# Patient Record
Sex: FEMALE | Race: BLACK OR AFRICAN AMERICAN | Employment: FULL TIME | URBAN - METROPOLITAN AREA
[De-identification: names, ages, dates, MRNs, and addresses within clinical notes are randomized per-mention and may not be internally consistent; named-entity substitution may affect disease eponyms.]

---

## 2019-04-07 PROBLEM — R55 SYNCOPE AND COLLAPSE: Status: ACTIVE | Noted: 2019-04-07

## 2019-04-07 PROBLEM — R55 SYNCOPE: Status: ACTIVE | Noted: 2019-04-07

## 2019-04-29 ENCOUNTER — OFFICE VISIT (OUTPATIENT)
Dept: FAMILY MEDICINE CLINIC | Age: 39
End: 2019-04-29

## 2019-04-29 VITALS
HEIGHT: 64 IN | BODY MASS INDEX: 28.54 KG/M2 | SYSTOLIC BLOOD PRESSURE: 130 MMHG | RESPIRATION RATE: 16 BRPM | TEMPERATURE: 97.4 F | WEIGHT: 167.2 LBS | DIASTOLIC BLOOD PRESSURE: 68 MMHG | OXYGEN SATURATION: 99 % | HEART RATE: 96 BPM

## 2019-04-29 DIAGNOSIS — K59.00 CONSTIPATION, UNSPECIFIED CONSTIPATION TYPE: ICD-10-CM

## 2019-04-29 DIAGNOSIS — I10 ESSENTIAL HYPERTENSION: ICD-10-CM

## 2019-04-29 DIAGNOSIS — E78.00 HYPERCHOLESTEREMIA: ICD-10-CM

## 2019-04-29 DIAGNOSIS — Z79.4 TYPE 2 DIABETES MELLITUS WITH COMPLICATION, WITH LONG-TERM CURRENT USE OF INSULIN (HCC): ICD-10-CM

## 2019-04-29 DIAGNOSIS — I65.22 STENOSIS OF CAVERNOUS PORTION OF LEFT INTERNAL CAROTID ARTERY: ICD-10-CM

## 2019-04-29 DIAGNOSIS — E87.6 HYPOKALEMIA: ICD-10-CM

## 2019-04-29 DIAGNOSIS — E87.6 HYPOKALEMIA: Primary | ICD-10-CM

## 2019-04-29 DIAGNOSIS — E11.8 TYPE 2 DIABETES MELLITUS WITH COMPLICATION, WITH LONG-TERM CURRENT USE OF INSULIN (HCC): ICD-10-CM

## 2019-04-29 DIAGNOSIS — I65.21 STENOSIS OF CAVERNOUS PORTION OF RIGHT INTERNAL CAROTID ARTERY: ICD-10-CM

## 2019-04-29 RX ORDER — ASPIRIN 81 MG/1
81 TABLET ORAL DAILY
COMMUNITY

## 2019-04-29 RX ORDER — INSULIN GLARGINE 100 [IU]/ML
20 INJECTION, SOLUTION SUBCUTANEOUS
COMMUNITY
End: 2021-04-30

## 2019-04-29 RX ORDER — CLOPIDOGREL BISULFATE 75 MG/1
TABLET ORAL
COMMUNITY
End: 2019-05-29 | Stop reason: SDUPTHER

## 2019-04-29 RX ORDER — LANCETS
EACH MISCELLANEOUS
Qty: 1 EACH | Refills: 11 | Status: SHIPPED | OUTPATIENT
Start: 2019-04-29

## 2019-04-29 RX ORDER — LOSARTAN POTASSIUM 100 MG/1
100 TABLET ORAL DAILY
COMMUNITY
End: 2019-05-29 | Stop reason: SDUPTHER

## 2019-04-29 RX ORDER — AMLODIPINE BESYLATE 10 MG/1
TABLET ORAL DAILY
COMMUNITY
End: 2019-05-29 | Stop reason: SDUPTHER

## 2019-04-29 RX ORDER — INSULIN PUMP SYRINGE, 3 ML
EACH MISCELLANEOUS
Qty: 1 KIT | Refills: 0 | Status: CANCELLED | OUTPATIENT
Start: 2019-04-29

## 2019-04-29 RX ORDER — DOCUSATE SODIUM 100 MG/1
100 CAPSULE, LIQUID FILLED ORAL 2 TIMES DAILY
Qty: 60 CAP | Refills: 2 | Status: SHIPPED | OUTPATIENT
Start: 2019-04-29 | End: 2019-07-28

## 2019-04-29 RX ORDER — ATORVASTATIN CALCIUM 80 MG/1
80 TABLET, FILM COATED ORAL DAILY
COMMUNITY
End: 2019-05-29 | Stop reason: SDUPTHER

## 2019-04-29 RX ORDER — GLIPIZIDE 10 MG/1
10 TABLET ORAL 2 TIMES DAILY
COMMUNITY
End: 2019-05-29 | Stop reason: SDUPTHER

## 2019-04-29 RX ORDER — LANCETS
EACH MISCELLANEOUS
Qty: 1 EACH | Refills: 11 | Status: CANCELLED | OUTPATIENT
Start: 2019-04-29

## 2019-04-29 NOTE — PATIENT INSTRUCTIONS
Learning About Diabetes Food Guidelines  Your Care Instructions    Meal planning is important to manage diabetes. It helps keep your blood sugar at a target level (which you set with your doctor). You don't have to eat special foods. You can eat what your family eats, including sweets once in a while. But you do have to pay attention to how often you eat and how much you eat of certain foods. You may want to work with a dietitian or a certified diabetes educator (CDE) to help you plan meals and snacks. A dietitian or CDE can also help you lose weight if that is one of your goals. What should you know about eating carbs? Managing the amount of carbohydrate (carbs) you eat is an important part of healthy meals when you have diabetes. Carbohydrate is found in many foods. · Learn which foods have carbs. And learn the amounts of carbs in different foods. ? Bread, cereal, pasta, and rice have about 15 grams of carbs in a serving. A serving is 1 slice of bread (1 ounce), ½ cup of cooked cereal, or 1/3 cup of cooked pasta or rice. ? Fruits have 15 grams of carbs in a serving. A serving is 1 small fresh fruit, such as an apple or orange; ½ of a banana; ½ cup of cooked or canned fruit; ½ cup of fruit juice; 1 cup of melon or raspberries; or 2 tablespoons of dried fruit. ? Milk and no-sugar-added yogurt have 15 grams of carbs in a serving. A serving is 1 cup of milk or 2/3 cup of no-sugar-added yogurt. ? Starchy vegetables have 15 grams of carbs in a serving. A serving is ½ cup of mashed potatoes or sweet potato; 1 cup winter squash; ½ of a small baked potato; ½ cup of cooked beans; or ½ cup cooked corn or green peas. · Learn how much carbs to eat each day and at each meal. A dietitian or CDE can teach you how to keep track of the amount of carbs you eat. This is called carbohydrate counting. · If you are not sure how to count carbohydrate grams, use the Plate Method to plan meals.  It is a good, quick way to make sure that you have a balanced meal. It also helps you spread carbs throughout the day. ? Divide your plate by types of foods. Put non-starchy vegetables on half the plate, meat or other protein food on one-quarter of the plate, and a grain or starchy vegetable in the final quarter of the plate. To this you can add a small piece of fruit and 1 cup of milk or yogurt, depending on how many carbs you are supposed to eat at a meal.  · Try to eat about the same amount of carbs at each meal. Do not \"save up\" your daily allowance of carbs to eat at one meal.  · Proteins have very little or no carbs per serving. Examples of proteins are beef, chicken, turkey, fish, eggs, tofu, cheese, cottage cheese, and peanut butter. A serving size of meat is 3 ounces, which is about the size of a deck of cards. Examples of meat substitute serving sizes (equal to 1 ounce of meat) are 1/4 cup of cottage cheese, 1 egg, 1 tablespoon of peanut butter, and ½ cup of tofu. How can you eat out and still eat healthy? · Learn to estimate the serving sizes of foods that have carbohydrate. If you measure food at home, it will be easier to estimate the amount in a serving of restaurant food. · If the meal you order has too much carbohydrate (such as potatoes, corn, or baked beans), ask to have a low-carbohydrate food instead. Ask for a salad or green vegetables. · If you use insulin, check your blood sugar before and after eating out to help you plan how much to eat in the future. · If you eat more carbohydrate at a meal than you had planned, take a walk or do other exercise. This will help lower your blood sugar. What else should you know? · Limit saturated fat, such as the fat from meat and dairy products. This is a healthy choice because people who have diabetes are at higher risk of heart disease. So choose lean cuts of meat and nonfat or low-fat dairy products.  Use olive or canola oil instead of butter or shortening when cooking. · Don't skip meals. Your blood sugar may drop too low if you skip meals and take insulin or certain medicines for diabetes. · Check with your doctor before you drink alcohol. Alcohol can cause your blood sugar to drop too low. Alcohol can also cause a bad reaction if you take certain diabetes medicines. Follow-up care is a key part of your treatment and safety. Be sure to make and go to all appointments, and call your doctor if you are having problems. It's also a good idea to know your test results and keep a list of the medicines you take. Where can you learn more? Go to http://zina-roberta.info/. Enter K708 in the search box to learn more about \"Learning About Diabetes Food Guidelines. \"  Current as of: July 25, 2018  Content Version: 11.9  © 5691-2377 [employer]. Care instructions adapted under license by [employer] (which disclaims liability or warranty for this information). If you have questions about a medical condition or this instruction, always ask your healthcare professional. [place] [address] any warranty or liability for your use of this information. Counting Carbohydrates: Care Instructions  Your Care Instructions    You don't have to eat special foods when you have diabetes. You just have to be careful to eat healthy foods. Carbohydrates (carbs) raise blood sugar higher and quicker than any other nutrient. Carbs are found in desserts, breads and cereals, and fruit. They're also in starchy vegetables. These include potatoes, corn, and grains such as rice and pasta. Carbs are also in milk and yogurt. The more carbs you eat at one time, the higher your blood sugar will rise. Spreading carbs all through the day helps keep your blood sugar levels within your target range. Counting carbs is one of the best ways to keep your blood sugar under control.   If you use insulin, counting carbs helps you match the right amount of insulin to the number of grams of carbs in a meal. Then you can change your diet and insulin dose as needed. Testing your blood sugar several times a day can help you learn how carbs affect your blood sugar. A registered dietitian or certified diabetes educator can help you plan meals and snacks. Follow-up care is a key part of your treatment and safety. Be sure to make and go to all appointments, and call your doctor if you are having problems. It's also a good idea to know your test results and keep a list of the medicines you take. How can you care for yourself at home? Know your daily amount of carbohydrates  Your daily amount depends on several things, such as your weight, how active you are, which diabetes medicines you take, and what your goals are for your blood sugar levels. A registered dietitian or certified diabetes educator can help you plan how many carbs to include in each meal and snack. For most adults, a guideline for the daily amount of carbs is:  · 45 to 60 grams at each meal. That's about the same as 3 to 4 carbohydrate servings. · 15 to 20 grams at each snack. That's about the same as 1 carbohydrate serving. Count carbs  Counting carbs lets you know how much rapid-acting insulin to take before you eat. If you use an insulin pump, you get a constant rate of insulin during the day. So the pump must be programmed at meals. This gives you extra insulin to cover the rise in blood sugar after meals. If you take insulin:  · Learn your own insulin-to-carb ratio. You and your diabetes health professional will figure out the ratio. You can do this by testing your blood sugar after meals. For example, you may need a certain amount of insulin for every 15 grams of carbs. · Add up the carb grams in a meal. Then you can figure out how many units of insulin to take based on your insulin-to-carb ratio. · Exercise lowers blood sugar.  You can use less insulin than you would if you were not doing exercise. Keep in mind that timing matters. If you exercise within 1 hour after a meal, your body may need less insulin for that meal than it would if you exercised 3 hours after the meal. Test your blood sugar to find out how exercise affects your need for insulin. If you do or don't take insulin:  · Look at labels on packaged foods. This can tell you how many carbs are in a serving. You can also use guides from the American Diabetes Association. · Be aware of portions, or serving sizes. If a package has two servings and you eat the whole package, you need to double the number of grams of carbohydrate listed for one serving. · Protein, fat, and fiber do not raise blood sugar as much as carbs do. If you eat a lot of these nutrients in a meal, your blood sugar will rise more slowly than it would otherwise. Eat from all food groups  · Eat at least three meals a day. · Plan meals to include food from all the food groups. The food groups include grains, fruits, dairy, proteins, and vegetables. · Talk to your dietitian or diabetes educator about ways to add limited amounts of sweets into your meal plan. · If you drink alcohol, talk to your doctor. It may not be recommended when you are taking certain diabetes medicines. Where can you learn more? Go to http://zina-roberta.info/. Enter J111 in the search box to learn more about \"Counting Carbohydrates: Care Instructions. \"  Current as of: July 25, 2018  Content Version: 11.9  © 2257-8913 [email or web address]. Care instructions adapted under license by [employer] (which disclaims liability or warranty for this information). If you have questions about a medical condition or this instruction, always ask your healthcare professional. [place] [address] any warranty or liability for your use of this information.

## 2019-04-29 NOTE — PROGRESS NOTES
ESTABLISH CARE VISIT    SUBJECTIVE:     HPI: 42 y.o.  female  has a past medical history of Diabetes ([address] Hypercholesteremia, and Hypertension. is here for the above chief complaint(s). Patient states that she was first diagnosed with diabetes in 2002. Patient states that she was diagnosed with DM after her first child. She reports that she was trying to manage diabetes but then lost insurance. She also states that she wasn't feeling poorly so she didn't feel she needed to go to the doctor. Patient states that a few weeks ago she had a syncopal episode and went to the ED. She states tests were done and she was noted to have diabetes and was discharged with metformin. About a week later, she woke up in the middle of the night with chest pain. 911 was called and she went to the [facility]. Notes per [facility]:    ROSARIO GIFFORD Course   Chief Complaint/History of Present Illness:   HPI  Patient with h/o DM, supposed to be on metformin but does not take it due to GI side effects, presents with chest pain, associated with shortness of breath that woke her up from sleep today. 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 She also had a syncopal episode last sunday    Problems Managed During Hospitalization:  Chest pain, r/o acs  -serial CE noted  -tele  -nst wnl     Headache  -resolved     DM II  -does not want to take metformin due to side effects  -added glipizide and increased januvia  -lantus  -optimize as outpatient  -HbA1c 11.7     HTN, uncontrolled  -norvasc  -cozaar  -improved now  -optimize as outpatient     Recent syncope  -none recurrent  -echo  -MRI/MRA noted     Cavernous ica severe stenosis  -DAPT per d/w   -lipitor  -repeat cta in 1 year  -BP, DM control and smoking cessation     HLD  -lipitor  -ldl >300     Hypokalemia  -replaced     Elevated d-dimer  -pvl no vte  -cta no VTE     Tobacco abuse  -counseled.      Hospital Course:  Patient admitted with above, remained stable in house, BP and BG now improved, overall prognosis is guarded. Patient is felt to have achieved maximum benefit from inpatient stay and is in stable health to be discharged. DM-2  Patient has been taking her medications as prescribed. Key Antihyperglycemic Medications             insulin glargine (LANTUS U-100 INSULIN) 100 unit/mL injection (Taking) 15 Units by SubCUTAneous route nightly. glipiZIDE (GLUCOTROL) 10 mg tablet (Taking) Take 10 mg by mouth two (2) times a day. SITagliptin (JANUVIA) 100 mg tablet (Taking) Take 100 mg by mouth daily. Patient is tolerating medications well, but she does endorse constipation. Patient endorses polyuria, polydipsia. Patient has bilateral numbness and pain in her feet. She also has some blurred vision. She went to see an eye doctor (optometrist), but he explained to her that her vision will improve as her diabetes improves and to return for another visit in a few months. Lab Results   Component Value Date/Time    Hemoglobin A1c 12.5 (H) 04/07/2019 04:37 AM       medication compliance: compliant all of the time, diabetic diet compliance: compliant most of the time, home glucose monitoring: fasting values range 180's, nonfasting values range 180's (one time is 202)  Patient has discontinued soda and is trying to improve her eating habits. Hypertension  Patient is currently taking   Key CAD CHF Meds             amLODIPine (NORVASC) 10 mg tablet (Taking) Take  by mouth daily. aspirin delayed-release 81 mg tablet (Taking) Take  by mouth daily. losartan (COZAAR) 100 mg tablet (Taking) Take 100 mg by mouth daily. . BP today is   BP Readings from Last 1 Encounters:   04/07/19 140/90   Repeat /68    Patient has been taking medications as prescribed. Patient is checking BP at home. Patient's BP averages 120/70's. 2 times she had 150's/90's. Patient is not follow low salt diet.  Patient is not currently exercising. Patient denies chest pain, shortness of breath, palpitations, lower extremity edema, dizziness/lightheadedness or syncopal episodes. Elevated Lipids/Cavernous ica severe stenosis    atorvastatin (LIPITOR) 80 mg tablet (Taking) Take 80 mg by mouth daily. clopidogrel (PLAVIX) 75 mg tab (Taking) Take  by mouth. Lab Results   Component Value Date/Time    Cholesterol, total 326 (H) 04/07/2019 04:37 AM    HDL Cholesterol 29 (L) 04/07/2019 04:37 AM    LDL, calculated Not Performed, Trig >400 04/07/2019 04:37 AM    Triglyceride 497 (H) 04/07/2019 04:37 AM    CHOL/HDL Ratio 11.2 (H) 04/07/2019 04:37 AM       Obesity    Wt Readings from Last 5 Encounters:   04/07/19 175 lb (79.4 kg)   01/25/19 170 lb (77.1 kg)   11/01/18 160 lb (72.6 kg)   12/09/17 170 lb (77.1 kg)   09/28/17 183 lb (83 kg)       Diet: advised diabetic diet. Advised diabetic counseling, patient declined at this time as she states she has done this before and doesn't feel she needs it. Will continue conversation. Exercise: Patient has not started exercising yet. Her mother is currently in the hospital with a leg amputation so she has been unable to begin exercising. She does state that she has been walking the halls at the hospital.    Stress management/Tobacco abuse- stress not managed well. She has a lot of stress right now with her mother's illness. Tobacco abuse  Patient has been smoking    Health Maintenance:    Eye -  no complaints, last eye exam: 1 month ago, needs to follow-up in 1-2 months  Oral Health -  no complaints, last exam:   Hearing -  no complaints. U/A -  no UTI sxs  Cardiac- denies history, denies chest pain.  ACS work-up negative in the ED   Pulmonary health/Smoking history- , denies cough, SOB.- Smoking - Current smoker-   GYN-  Due, patient will make apt with me for well woman exam  DEXA - screen at 50  Colonoscopy - no colon cancer in the family, screen at 48       Review of Systems   Constitutional: Negative for chills, fever, malaise/fatigue and weight loss. Eyes: Negative for blurred vision, double vision and pain. Respiratory: Negative for cough, sputum production, shortness of breath and wheezing. Cardiovascular: Negative for chest pain, palpitations, orthopnea, claudication and leg swelling. Gastrointestinal: Positive for constipation. Negative for abdominal pain, diarrhea, nausea and vomiting. Genitourinary: Negative for dysuria, frequency and urgency. Neurological: Positive for tingling. Negative for dizziness, sensory change, speech change, focal weakness, weakness and headaches. Current Outpatient Medications:     glucose blood VI test strips (ASCENSIA AUTODISC VI, ONE TOUCH ULTRA TEST VI) strip, To be used to check glucose levels twice a day., Disp: 100 Strip, Rfl: 3    insulin glargine (LANTUS U-100 INSULIN) 100 unit/mL injection, 15 Units by SubCUTAneous route nightly., Disp: , Rfl:     amLODIPine (NORVASC) 10 mg tablet, Take  by mouth daily. , Disp: , Rfl:     aspirin delayed-release 81 mg tablet, Take  by mouth daily. , Disp: , Rfl:     atorvastatin (LIPITOR) 80 mg tablet, Take 80 mg by mouth daily. , Disp: , Rfl:     clopidogrel (PLAVIX) 75 mg tab, Take  by mouth., Disp: , Rfl:     glipiZIDE (GLUCOTROL) 10 mg tablet, Take 10 mg by mouth two (2) times a day., Disp: , Rfl:     losartan (COZAAR) 100 mg tablet, Take 100 mg by mouth daily. , Disp: , Rfl:     SITagliptin (JANUVIA) 100 mg tablet, Take 100 mg by mouth daily. , Disp: , Rfl:     lancets misc, To be used twice daily for diabetic testing., Disp: 1 Each, Rfl: 11    docusate sodium (DULCOLAX STOOL SOFTENER, DSS,) 100 mg capsule, Take 1 Cap by mouth two (2) times a day for 90 days. , Disp: 60 Cap, Rfl: 2    Health Maintenance   Topic Date Due    Pneumococcal 0-64 years (1 of 1 - PPSV23) 01/15/1988    FOOT EXAM Q1  01/15/1992    MICROALBUMIN Q1  01/15/1992    EYE EXAM RETINAL OR DILATED  01/15/1992    DTaP/Tdap/Td series (1 - Tdap) 01/15/2003    PAP AKA CERVICAL CYTOLOGY  01/15/2003    Influenza Age 9 to Adult  08/01/2019    HEMOGLOBIN A1C Q6M  10/07/2019    LIPID PANEL Q1  04/07/2020       Medications and Allergies: Reviewed and confirmed in the chart    Past Medical Hx: Reviewed and confirmed in the chart  Past Medical History:   Diagnosis Date    Diabetes ([facility] [address]     Hypercholesteremia     Hypertension        Patient Active Problem List   Diagnosis Code    Syncope and collapse R55    Syncope R55       Family Hx, Surgical Hx, Social Hx: Reviewed and updated in EMR    OBJECTIVE:  There were no vitals filed for this visit. BP Readings from Last 3 Encounters:   04/07/19 140/90   01/25/19 (!) 157/91   11/01/18 (!) 146/97     Wt Readings from Last 3 Encounters:   04/07/19 175 lb (79.4 kg)   01/25/19 170 lb (77.1 kg)   11/01/18 160 lb (72.6 kg)        Physical Exam   Constitutional: She is oriented to person, place, and time and well-developed, well-nourished, and in no distress. No distress. Neck: Normal range of motion. Neck supple. No thyromegaly present. Cardiovascular: Normal rate, regular rhythm, normal heart sounds and intact distal pulses. Exam reveals no gallop and no friction rub. No murmur heard. Pulses:       Carotid pulses are 2+ on the right side, and 2+ on the left side. BILATERAL 2+ LOWER EXTREMITY EDEMA   Pulmonary/Chest: Effort normal and breath sounds normal. No respiratory distress. She has no wheezes. She has no rales. She exhibits no tenderness. Lymphadenopathy:     She has no cervical adenopathy. Neurological: She is alert and oriented to person, place, and time. Skin: Skin is warm and dry. She is not diaphoretic. Psychiatric: Mood, memory, affect and judgment normal.   Vitals reviewed.         Lab Results   Component Value Date/Time    WBC 9.8 04/07/2019 04:37 AM    HGB 11.5 (L) 04/07/2019 04:37 AM    HCT 33.8 (L) 04/07/2019 04:37 AM    PLATELET 787 43/50/9168 04:37 AM     Lab Results   Component Value Date/Time    Sodium 135 (L) 04/07/2019 04:37 AM    Potassium 3.1 (L) 04/07/2019 04:37 AM    Chloride 100 04/07/2019 04:37 AM    CO2 30 04/07/2019 04:37 AM    Glucose 300 (H) 04/07/2019 04:37 AM    BUN 9 04/07/2019 04:37 AM    Creatinine 0.8 04/07/2019 04:37 AM     Lab Results   Component Value Date/Time    Cholesterol, total 326 (H) 04/07/2019 04:37 AM    HDL Cholesterol 29 (L) 04/07/2019 04:37 AM    LDL, calculated Not Performed, Trig >400 04/07/2019 04:37 AM    Triglyceride 497 (H) 04/07/2019 04:37 AM     No results found for: GPT    CTA BRAIN AND NECK  IMPRESSION  1. SARAH cavernous segment 60% diameter stenosis  -LICA cavernous segment 40% diameter stenosis  -Recommend at least one year CTA or MRA head  2. Other intracranial arteries  -Mild LMCA stenosis (only)  3. Neck arteries  -Carotids nonstenotic  -RVA suggested mild origin stenosis  -LVA V1 segment poorly evaluated. Nonstenotic more distally  Nursing Notes Reviewed    ASSESSMENT AND PLAN    ICD-10-CM ICD-9-CM    1. Hypokalemia E87.6 276.8 POTASSIUM      MAGNESIUM   2. Type 2 diabetes mellitus with complication, with long-term current use of insulin (HCC) E11.8 250.90 glucose blood VI test strips (ASCENSIA AUTODISC VI, ONE TOUCH ULTRA TEST VI) strip    Z79.4 V58.67 lancets misc   3. Essential hypertension I10 401.9 Continue current management. Key CAD CHF Meds             amLODIPine (NORVASC) 10 mg tablet (Taking) Take  by mouth daily. aspirin delayed-release 81 mg tablet (Taking) Take  by mouth daily. atorvastatin (LIPITOR) 80 mg tablet (Taking) Take 80 mg by mouth daily. clopidogrel (PLAVIX) 75 mg tab (Taking) Take  by mouth.    losartan (COZAAR) 100 mg tablet (Taking) Take 100 mg by mouth daily. 4. Hypercholesteremia E78.00 272.0 Continue current management as above. 5. Constipation, unspecified constipation type K59.00 564.00 docusate sodium (DULCOLAX STOOL SOFTENER, DSS,) 100 mg capsule   6.  Stenosis of cavernous portion of left internal carotid artery I65.22 433.10 Repeat CTA in 1 year. Discuss vascular referral at next visit. 7. Stenosis of cavernous portion of right internal carotid artery I65.21 433.10 As above       I have discussed the diagnosis with the patient and the intended plan as seen in the above orders. The patient has received an after-visit summary and questions were answered concerning future plans. I have discussed medication side effects and warnings with the patient as well. I have reviewed the plan of care with the patient, accepted their input and they are in agreement with the treatment goals. A total of 45 minutes were spent face-to-face with the patient during this encounter and over half of that time was spent on counseling and coordination of care. We discussed in depth the importance of managing diabetes, hypertension, hyperlipidemia. I also educated the patient about stenosis of SARAH and LICA. Renetta DENT  [address] [place] [address] [phone] [place] [address] [phone]  [phone]

## 2019-04-29 NOTE — PROGRESS NOTES
Chief Complaint   Patient presents with   [address] Follow Up     [facility] and [facility]-passed out, hypertension, chest pain, SOB, DM     1. Have you been to the ER, urgent care clinic since your last visit? Hospitalized since your last visit? [facility] and [facility]-April 2019    2. Have you seen or consulted any other health care providers outside of the [address] [place] [address] since your last visit? Include any pap smears or colon screening.  No

## 2019-04-30 LAB
MAGNESIUM SERPL-MCNC: 2.1 MG/DL (ref 1.6–2.3)
POTASSIUM SERPL-SCNC: 3.6 MMOL/L (ref 3.5–5.2)

## 2019-05-03 ENCOUNTER — TELEPHONE (OUTPATIENT)
Dept: FAMILY MEDICINE CLINIC | Age: 39
End: 2019-05-03

## 2019-05-03 NOTE — TELEPHONE ENCOUNTER
Called patient and LVM to call back. Need to explain that her potassium levels are normal, but borderline. I would like to start her on a low dose potassium supplement. Will call in to her pharmacy if she is amenable. Renetta Durant PA-C  [facility]  [phone]  [address]

## 2019-05-29 ENCOUNTER — OFFICE VISIT (OUTPATIENT)
Dept: FAMILY MEDICINE CLINIC | Age: 39
End: 2019-05-29

## 2019-05-29 VITALS
BODY MASS INDEX: 25.78 KG/M2 | SYSTOLIC BLOOD PRESSURE: 130 MMHG | DIASTOLIC BLOOD PRESSURE: 84 MMHG | HEART RATE: 96 BPM | WEIGHT: 151 LBS | OXYGEN SATURATION: 98 % | HEIGHT: 64 IN | TEMPERATURE: 95.7 F | RESPIRATION RATE: 16 BRPM

## 2019-05-29 DIAGNOSIS — E87.6 HYPOKALEMIA: ICD-10-CM

## 2019-05-29 DIAGNOSIS — R11.0 NAUSEA: ICD-10-CM

## 2019-05-29 DIAGNOSIS — I10 ESSENTIAL HYPERTENSION: ICD-10-CM

## 2019-05-29 DIAGNOSIS — A04.72 C. DIFFICILE COLITIS: ICD-10-CM

## 2019-05-29 DIAGNOSIS — E87.6 HYPOKALEMIA: Primary | ICD-10-CM

## 2019-05-29 DIAGNOSIS — E78.00 HYPERCHOLESTEREMIA: ICD-10-CM

## 2019-05-29 DIAGNOSIS — Z79.4 TYPE 2 DIABETES MELLITUS WITH COMPLICATION, WITH LONG-TERM CURRENT USE OF INSULIN (HCC): ICD-10-CM

## 2019-05-29 DIAGNOSIS — I65.22 STENOSIS OF CAVERNOUS PORTION OF LEFT INTERNAL CAROTID ARTERY: ICD-10-CM

## 2019-05-29 DIAGNOSIS — E11.8 TYPE 2 DIABETES MELLITUS WITH COMPLICATION, WITH LONG-TERM CURRENT USE OF INSULIN (HCC): ICD-10-CM

## 2019-05-29 RX ORDER — METOPROLOL TARTRATE 25 MG/1
TABLET, FILM COATED ORAL 2 TIMES DAILY
COMMUNITY
End: 2020-02-01

## 2019-05-29 RX ORDER — CLOPIDOGREL BISULFATE 75 MG/1
75 TABLET ORAL DAILY
Qty: 90 TAB | Refills: 1 | Status: SHIPPED | OUTPATIENT
Start: 2019-05-29

## 2019-05-29 RX ORDER — ATORVASTATIN CALCIUM 80 MG/1
80 TABLET, FILM COATED ORAL DAILY
Qty: 90 TAB | Refills: 1 | Status: SHIPPED | OUTPATIENT
Start: 2019-05-29

## 2019-05-29 RX ORDER — GLIPIZIDE 10 MG/1
10 TABLET ORAL 2 TIMES DAILY
Qty: 90 TAB | Refills: 1 | Status: SHIPPED | OUTPATIENT
Start: 2019-05-29 | End: 2019-09-27

## 2019-05-29 RX ORDER — PROMETHAZINE HYDROCHLORIDE 12.5 MG/1
12.5 TABLET ORAL
Qty: 30 TAB | Refills: 0 | Status: SHIPPED | OUTPATIENT
Start: 2019-05-29 | End: 2020-02-01

## 2019-05-29 RX ORDER — AMLODIPINE BESYLATE 10 MG/1
10 TABLET ORAL DAILY
Qty: 90 TAB | Refills: 1 | Status: SHIPPED | OUTPATIENT
Start: 2019-05-29 | End: 2020-02-01

## 2019-05-29 RX ORDER — LOSARTAN POTASSIUM 100 MG/1
100 TABLET ORAL DAILY
Qty: 90 TAB | Refills: 1 | Status: SHIPPED | OUTPATIENT
Start: 2019-05-29 | End: 2020-02-01

## 2019-05-29 NOTE — LETTER
NOTIFICATION RETURN TO WORK / SCHOOL 
 
5/29/2019 10:32 AM 
 
Ms. Carrie [place] [address] [place] [address] To Whom It May Concern: 
 
Britni Tao is currently under the care of [facility]. She will return to work/school on: 5/30/2019 If there are questions or concerns please have the patient contact our office.  
 
 
 
Sincerely, 
 
 
Tonia Valenzuela PA-C

## 2019-05-29 NOTE — PROGRESS NOTES
Patient: Santos Razo  Date of Service: 5/29/2019   Provider: Sheryle Slot, PA-C     REASON FOR VISIT:   Chief Complaint   Patient presents with   [facility] Follow Up     Larisa Lyons-C-diff- 5/11/19. Last episode of diarrhea was 5/11/19        HISTORY OF PRESENT ILLNESS:   Santos Razo is a 42 y.o. female who presents to the office for acute care. C. Diff Infection  Patient went to the ED on 5/09/2019 with nausea,vomting, diarrhea. She was given IV hydration and antiemetic and discharged. She became more ill, with increased vomiting and diarrhea, so she returned ot the ED on 5/11/2019. C. Diff came testing came back positive. She was admitted and given IV antibiotics, ultimately discharged after 1 day and given outpatient vancomycin for c.diff infection. Last episode of diarrhea was 5/11/2019. Patient has had normal, formed BM nearly every day since 5/11. She continues to have some nausea and has been treating this with dramamine. She denies any further vomiting episodes. She denies any abdominal pain or diarrhea, no bloody stools or darks stools at this time. She is ready to go back to work and would like [employer] to return to work. Hypertension  Patient's BP was elevated while in the hospital, she was given metoprolol 25mg BID on discharge. Her BP has been controlled at home, someimtes elevated first thing in the morning. Patient is currently taking   Key CAD CHF Meds             metoprolol tartrate (LOPRESSOR) 25 mg tablet (Taking) Take  by mouth two (2) times a day. amLODIPine (NORVASC) 10 mg tablet (Taking) Take  by mouth daily. aspirin delayed-release 81 mg tablet (Taking) Take  by mouth daily. atorvastatin (LIPITOR) 80 mg tablet (Taking) Take 80 mg by mouth daily. clopidogrel (PLAVIX) 75 mg tab (Taking) Take  by mouth.    losartan (COZAAR) 100 mg tablet (Taking) Take 100 mg by mouth daily.       . BP today is   BP Readings from Last 1 Encounters:   05/29/19 (!) 153/102     Patient has been taking medications as prescribed. Patient is/is not checking BP at home. Patient's BP averages 120's/90's Patient does follow low salt diet. Patient is not currently exercising. Patient denies chest pain, shortness of breath, palpitations, lower extremity edema, dizziness/lightheadedness or syncopal episodes. Review of Systems   Constitutional: Negative for chills, fever, malaise/fatigue and weight loss. Eyes: Negative for blurred vision, double vision and pain. Respiratory: Negative for cough, sputum production, shortness of breath and wheezing. Cardiovascular: Negative for chest pain, palpitations, orthopnea, claudication and leg swelling. Gastrointestinal: Positive for nausea. Negative for abdominal pain, blood in stool, constipation, diarrhea, melena and vomiting. Genitourinary: Negative for dysuria, frequency and urgency. Neurological: Negative for dizziness, tingling and headaches. Physical Exam   Constitutional: She is oriented to person, place, and time and well-developed, well-nourished, and in no distress. No distress. Neck: Normal range of motion. Neck supple. No thyromegaly present. Cardiovascular: Normal rate, regular rhythm, normal heart sounds and intact distal pulses. Exam reveals no gallop and no friction rub. No murmur heard. Pulmonary/Chest: Effort normal and breath sounds normal. No respiratory distress. She has no wheezes. She has no rales. She exhibits no tenderness. Abdominal: Soft. Bowel sounds are normal. She exhibits no distension and no mass. There is no tenderness. There is no rebound and no guarding. Lymphadenopathy:     She has no cervical adenopathy. Neurological: She is alert and oriented to person, place, and time. Skin: Skin is warm and dry. She is not diaphoretic. Psychiatric: Mood, memory, affect and judgment normal.   Vitals reviewed.           Visit Vitals  BP (!) 153/102   Pulse 96   Temp 95.7 °F (35.4 °C) (Oral)   Resp 16   Ht 5' 4\" (1.626 m)   Wt 151 lb (68.5 kg)   LMP 05/17/2019 (Approximate)   SpO2 98%   BMI 25.92 kg/m²         ALLERGIES:   No Known Allergies     ACTIVE MEDICAL PROBLEMS:  Patient Active Problem List   Diagnosis Code    Syncope and collapse R55    Syncope R55    Hypokalemia E87.6    Hypertension I10    Hypercholesteremia E78.00    Diabetes ([facility] [address] E11.9        MEDICATIONS:   Current Outpatient Medications   Medication Sig Dispense Refill    metoprolol tartrate (LOPRESSOR) 25 mg tablet Take  by mouth two (2) times a day.  glucose blood VI test strips (ASCENSIA AUTODISC VI, ONE TOUCH ULTRA TEST VI) strip To be used to check glucose levels twice a day. 100 Strip 3    insulin glargine (LANTUS U-100 INSULIN) 100 unit/mL injection 15 Units by SubCUTAneous route nightly.  amLODIPine (NORVASC) 10 mg tablet Take  by mouth daily.  aspirin delayed-release 81 mg tablet Take  by mouth daily.  atorvastatin (LIPITOR) 80 mg tablet Take 80 mg by mouth daily.  clopidogrel (PLAVIX) 75 mg tab Take  by mouth.  glipiZIDE (GLUCOTROL) 10 mg tablet Take 10 mg by mouth two (2) times a day.  losartan (COZAAR) 100 mg tablet Take 100 mg by mouth daily.  SITagliptin (JANUVIA) 100 mg tablet Take 100 mg by mouth daily.  lancets misc To be used twice daily for diabetic testing. 1 Each 11    docusate sodium (DULCOLAX STOOL SOFTENER, DSS,) 100 mg capsule Take 1 Cap by mouth two (2) times a day for 90 days. 60 Cap 2    metFORMIN (GLUCOPHAGE) 500 mg tablet Take 1 Tab by mouth two (2) times daily (with meals). 30 Tab 0        ROS:   Pertinent positive as above in HPI. All others negative. PHYSICAL EXAMINATION:  Visit Vitals  BP (!) 153/102   Pulse 96   Temp 95.7 °F (35.4 °C) (Oral)   Resp 16   Ht 5' 4\" (1.626 m)   Wt 151 lb (68.5 kg)   LMP 05/17/2019 (Approximate)   SpO2 98%   BMI 25.92 kg/m²      Body mass index is 25.92 kg/m².       ASSESSMENT/PLAN:  Diagnoses and all orders for this visit:    1. Hypokalemia  -     POTASSIUM; Future  -     MAGNESIUM; Future    - will call with results. 2. Nausea  -     promethazine (PHENERGAN) 12.5 mg tablet; Take 1 Tab by mouth every six (6) hours as needed for Nausea. 3. C. difficile colitis  -     lactobacillus rhamnosus gg 10 billion cell (CULTURELLE) 10 billion cell capsule; Take 1 Cap by mouth daily. 4. Type 2 diabetes mellitus with complication, with long-term current use of insulin (HCC)  -     glipiZIDE (GLUCOTROL) 10 mg tablet; Take 1 Tab by mouth two (2) times a day. -     SITagliptin (JANUVIA) 100 mg tablet; Take 1 Tab by mouth daily. 5. Essential hypertension  -     amLODIPine (NORVASC) 10 mg tablet; Take 1 Tab by mouth daily. -     losartan (COZAAR) 100 mg tablet; Take 1 Tab by mouth daily. 6. Stenosis of cavernous portion of left internal carotid artery  -     clopidogrel (PLAVIX) 75 mg tab; Take 1 Tab by mouth daily. 7. Hypercholesteremia  -     atorvastatin (LIPITOR) 80 mg tablet; Take 1 Tab by mouth daily. Patient advised to return to clinic if symptoms persist or to ED if they become worse  Patient verbalized understanding to above instructions. A total of 25 minutes were spent face-to-face with the patient during this encounter and over half of that time was spent on counseling and coordination of care. We discussed in depth the importance of TAKING PROBIOTIC I also educated the patient about c. Diff.            Ginger Durant PA-C   5/29/2019   10:14 AM

## 2019-05-29 NOTE — PROGRESS NOTES
Chief Complaint   Patient presents with   [address] [id number]- 5/11/19. Last episode of diarrhea was 5/11/19     1. Have you been to the ER, urgent care clinic since your last visit? Hospitalized since your last visit? Larisa Hernandez Diff    2. Have you seen or consulted any other health care providers outside of the [address] [place] [address] since your last visit? Include any pap smears or colon screening.  No

## 2019-05-30 LAB
MAGNESIUM SERPL-MCNC: 2 MG/DL (ref 1.6–2.3)
POTASSIUM SERPL-SCNC: 3.9 MMOL/L (ref 3.5–5.2)

## 2019-09-27 DIAGNOSIS — E11.8 TYPE 2 DIABETES MELLITUS WITH COMPLICATION, WITH LONG-TERM CURRENT USE OF INSULIN (HCC): ICD-10-CM

## 2019-09-27 DIAGNOSIS — Z79.4 TYPE 2 DIABETES MELLITUS WITH COMPLICATION, WITH LONG-TERM CURRENT USE OF INSULIN (HCC): ICD-10-CM

## 2019-09-29 RX ORDER — GLIPIZIDE 10 MG/1
10 TABLET ORAL 2 TIMES DAILY
Qty: 90 TAB | Refills: 0 | Status: SHIPPED | OUTPATIENT
Start: 2019-09-29 | End: 2019-11-16 | Stop reason: SDUPTHER

## 2019-11-13 DIAGNOSIS — E11.8 TYPE 2 DIABETES MELLITUS WITH COMPLICATION, WITH LONG-TERM CURRENT USE OF INSULIN (HCC): ICD-10-CM

## 2019-11-13 DIAGNOSIS — Z79.4 TYPE 2 DIABETES MELLITUS WITH COMPLICATION, WITH LONG-TERM CURRENT USE OF INSULIN (HCC): ICD-10-CM

## 2019-11-13 NOTE — TELEPHONE ENCOUNTER
Per fax,     Requested Prescriptions     Pending Prescriptions Disp Refills    glipiZIDE (GLUCOTROL) 10 mg tablet 180 Tab 0     Sig: Take 1 Tab by mouth two (2) times a day.

## 2019-11-15 RX ORDER — GLIPIZIDE 10 MG/1
10 TABLET ORAL 2 TIMES DAILY
Qty: 180 TAB | Refills: 0 | OUTPATIENT
Start: 2019-11-15

## 2019-11-15 NOTE — TELEPHONE ENCOUNTER
Patient has not been seen since 5/2019. She is diabetic with uncontrolled HTN at last visit. No RF's until patient comes in.

## 2019-11-16 RX ORDER — GLIPIZIDE 10 MG/1
10 TABLET ORAL 2 TIMES DAILY
Qty: 60 TAB | Refills: 0 | Status: SHIPPED | OUTPATIENT
Start: 2019-11-16

## 2019-11-16 NOTE — TELEPHONE ENCOUNTER
Last RF until patient comes in. Renetta Durant PA-C  [facility]  [phone]  [place], [address] [place] [address]

## 2020-06-02 PROBLEM — N17.9 ACUTE RENAL FAILURE (ARF) (HCC): Status: ACTIVE | Noted: 2020-06-02

## 2020-06-02 PROBLEM — I10 ACCELERATED HYPERTENSION: Status: ACTIVE | Noted: 2020-06-02

## 2020-06-02 PROBLEM — F12.10 MARIJUANA ABUSE: Status: ACTIVE | Noted: 2020-06-02

## 2020-06-02 PROBLEM — R73.9 HYPERGLYCEMIA: Status: ACTIVE | Noted: 2020-06-02

## 2020-06-02 PROBLEM — R10.9 ABDOMINAL PAIN: Status: ACTIVE | Noted: 2020-06-02

## 2020-09-29 PROBLEM — R10.2 PELVIC PAIN: Status: ACTIVE | Noted: 2020-09-29

## 2021-05-19 ENCOUNTER — OFFICE VISIT (OUTPATIENT)
Dept: INTERNAL MEDICINE CLINIC | Age: 41
End: 2021-05-19

## 2021-05-19 VITALS — HEIGHT: 64 IN | BODY MASS INDEX: 32.17 KG/M2 | RESPIRATION RATE: 18 BRPM

## 2021-08-03 PROBLEM — I10 HYPERTENSION: Status: RESOLVED | Noted: 2021-08-03 | Resolved: 2021-08-03

## 2021-08-03 PROBLEM — R55 SYNCOPE: Status: RESOLVED | Noted: 2019-04-07 | Resolved: 2021-08-03

## 2022-03-18 PROBLEM — R10.9 ABDOMINAL PAIN: Status: ACTIVE | Noted: 2020-06-02

## 2022-03-18 PROBLEM — F12.10 MARIJUANA ABUSE: Status: ACTIVE | Noted: 2020-06-02

## 2022-03-18 PROBLEM — I10 ACCELERATED HYPERTENSION: Status: ACTIVE | Noted: 2020-06-02

## 2022-03-19 PROBLEM — R73.9 HYPERGLYCEMIA: Status: ACTIVE | Noted: 2020-06-02

## 2022-03-19 PROBLEM — N17.9 ACUTE RENAL FAILURE (ARF) (HCC): Status: ACTIVE | Noted: 2020-06-02

## 2022-03-19 PROBLEM — R55 SYNCOPE AND COLLAPSE: Status: ACTIVE | Noted: 2019-04-07

## 2022-03-20 PROBLEM — R10.2 PELVIC PAIN: Status: ACTIVE | Noted: 2020-09-29

## 2023-05-20 RX ORDER — LOSARTAN POTASSIUM 50 MG/1
50 TABLET ORAL DAILY
COMMUNITY

## 2023-05-20 RX ORDER — BISACODYL 5 MG/1
5 TABLET, DELAYED RELEASE ORAL DAILY PRN
COMMUNITY

## 2023-05-20 RX ORDER — IBUPROFEN 800 MG/1
TABLET ORAL
COMMUNITY

## 2023-05-20 RX ORDER — GLIPIZIDE 10 MG/1
10 TABLET ORAL 2 TIMES DAILY
COMMUNITY
Start: 2019-11-16

## 2023-05-20 RX ORDER — ONDANSETRON 4 MG/1
4 TABLET, ORALLY DISINTEGRATING ORAL EVERY 8 HOURS PRN
COMMUNITY
Start: 2020-07-22

## 2023-05-20 RX ORDER — CLOPIDOGREL BISULFATE 75 MG/1
75 TABLET ORAL DAILY
COMMUNITY
Start: 2019-05-29

## 2023-05-20 RX ORDER — LANCETS 30 GAUGE
EACH MISCELLANEOUS
COMMUNITY
Start: 2019-04-29

## 2023-05-20 RX ORDER — PROMETHAZINE HYDROCHLORIDE 25 MG/1
TABLET ORAL
COMMUNITY
Start: 2021-02-12

## 2023-05-20 RX ORDER — GABAPENTIN 300 MG/1
CAPSULE ORAL
COMMUNITY

## 2023-05-20 RX ORDER — OMEPRAZOLE 20 MG/1
CAPSULE, DELAYED RELEASE ORAL
COMMUNITY

## 2023-05-20 RX ORDER — FAMOTIDINE 20 MG/1
20 TABLET, FILM COATED ORAL EVERY 12 HOURS
COMMUNITY
Start: 2021-01-20

## 2023-05-20 RX ORDER — INSULIN GLARGINE 100 [IU]/ML
INJECTION, SOLUTION SUBCUTANEOUS
COMMUNITY

## 2023-05-20 RX ORDER — ASPIRIN 81 MG/1
81 TABLET ORAL DAILY
COMMUNITY

## 2023-05-20 RX ORDER — HYDROCHLOROTHIAZIDE 12.5 MG/1
TABLET ORAL
COMMUNITY

## 2023-05-20 RX ORDER — CARVEDILOL 3.12 MG/1
3.12 TABLET ORAL 2 TIMES DAILY WITH MEALS
COMMUNITY

## 2023-05-20 RX ORDER — ATORVASTATIN CALCIUM 80 MG/1
80 TABLET, FILM COATED ORAL DAILY
COMMUNITY
Start: 2019-05-29

## 2023-05-20 RX ORDER — POTASSIUM CHLORIDE 750 MG/1
10 TABLET, FILM COATED, EXTENDED RELEASE ORAL 2 TIMES DAILY
COMMUNITY